# Patient Record
Sex: FEMALE | URBAN - METROPOLITAN AREA
[De-identification: names, ages, dates, MRNs, and addresses within clinical notes are randomized per-mention and may not be internally consistent; named-entity substitution may affect disease eponyms.]

---

## 2019-12-10 ENCOUNTER — OFFICE VISIT (OUTPATIENT)
Dept: URBAN - METROPOLITAN AREA CLINIC 33 | Facility: CLINIC | Age: 69
End: 2019-12-10
Payer: COMMERCIAL

## 2019-12-10 DIAGNOSIS — H02.834 DERMATOCHALASIS OF LEFT UPPER EYELID: ICD-10-CM

## 2019-12-10 DIAGNOSIS — H02.831 DERMATOCHALASIS OF RIGHT UPPER EYELID: Primary | ICD-10-CM

## 2019-12-10 DIAGNOSIS — H25.13 AGE-RELATED NUCLEAR CATARACT, BILATERAL: ICD-10-CM

## 2019-12-10 PROCEDURE — 99203 OFFICE O/P NEW LOW 30 MIN: CPT | Performed by: OPTOMETRIST

## 2019-12-10 ASSESSMENT — INTRAOCULAR PRESSURE
OD: 12
OS: 13

## 2019-12-10 ASSESSMENT — VISUAL ACUITY
OS: 20/20
OD: 20/20

## 2019-12-10 NOTE — IMPRESSION/PLAN
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 Plan: Dermatochalasis of RUL/GINNY. Patient has recently noticed looking through her eyelashes. Recommend eyelid evaluation with ptosis visual field with Dr. Murali Cardona.

## 2022-10-18 ENCOUNTER — OFFICE VISIT (OUTPATIENT)
Dept: URBAN - METROPOLITAN AREA CLINIC 33 | Facility: CLINIC | Age: 72
End: 2022-10-18
Payer: COMMERCIAL

## 2022-10-18 DIAGNOSIS — H43.813 VITREOUS DEGENERATION, BILATERAL: Primary | ICD-10-CM

## 2022-10-18 DIAGNOSIS — H52.4 PRESBYOPIA: ICD-10-CM

## 2022-10-18 DIAGNOSIS — H25.13 AGE-RELATED NUCLEAR CATARACT, BILATERAL: ICD-10-CM

## 2022-10-18 DIAGNOSIS — H04.123 DRY EYE SYNDROME OF BILATERAL LACRIMAL GLANDS: ICD-10-CM

## 2022-10-18 PROCEDURE — 92014 COMPRE OPH EXAM EST PT 1/>: CPT

## 2022-10-18 PROCEDURE — 92134 CPTRZ OPH DX IMG PST SGM RTA: CPT

## 2022-10-18 ASSESSMENT — INTRAOCULAR PRESSURE
OD: 11
OS: 11

## 2022-10-18 ASSESSMENT — KERATOMETRY
OS: 40.63
OD: 41.00

## 2022-10-18 ASSESSMENT — VISUAL ACUITY
OS: 20/25
OD: 20/25

## 2022-10-18 NOTE — IMPRESSION/PLAN
Impression: Vitreous degeneration, bilateral: H43.813.
-(-)juana sign OU
-MAC OCT ordered showing normal foveal contour OD, OS Plan: Patient educated on ocular findings. Discussed symptoms of retinal detachment. Recommended patient to return to clinic if he/she notes new onset of floaters, flashes of light, and/or curtain of veil over vision immediately. Continue to monitor.